# Patient Record
Sex: FEMALE | Race: WHITE | Employment: FULL TIME | URBAN - METROPOLITAN AREA
[De-identification: names, ages, dates, MRNs, and addresses within clinical notes are randomized per-mention and may not be internally consistent; named-entity substitution may affect disease eponyms.]

---

## 2017-12-09 ENCOUNTER — HOSPITAL ENCOUNTER (OUTPATIENT)
Age: 27
Setting detail: SPECIMEN
Discharge: HOME | End: 2017-12-09
Payer: COMMERCIAL

## 2017-12-09 LAB
ABSOLUTE EOS #: 0.4 K/UL (ref 0–0.4)
ABSOLUTE IMMATURE GRANULOCYTE: ABNORMAL K/UL (ref 0–0.3)
ABSOLUTE LYMPH #: 2.5 K/UL (ref 1–4.8)
ABSOLUTE MONO #: 1.2 K/UL (ref 0.2–0.8)
BASOPHILS # BLD: 1 % (ref 0–2)
BASOPHILS ABSOLUTE: 0.1 K/UL (ref 0–0.2)
DIFFERENTIAL TYPE: ABNORMAL
EOSINOPHILS RELATIVE PERCENT: 4 % (ref 1–4)
HCT VFR BLD CALC: 34.3 % (ref 36–46)
HEMOGLOBIN: 11.4 G/DL (ref 12–16)
IMMATURE GRANULOCYTES: ABNORMAL %
LYMPHOCYTES # BLD: 23 % (ref 24–44)
MCH RBC QN AUTO: 31.3 PG (ref 26–34)
MCHC RBC AUTO-ENTMCNC: 33.4 G/DL (ref 31–37)
MCV RBC AUTO: 93.8 FL (ref 80–100)
MONOCYTES # BLD: 11 % (ref 1–7)
PDW BLD-RTO: 16.4 % (ref 11.5–14.5)
PLATELET # BLD: 250 K/UL (ref 130–400)
PLATELET ESTIMATE: ABNORMAL
PMV BLD AUTO: 7.5 FL (ref 6–12)
RBC # BLD: 3.66 M/UL (ref 4–5.2)
RBC # BLD: ABNORMAL 10*6/UL
SEG NEUTROPHILS: 61 % (ref 36–66)
SEGMENTED NEUTROPHILS ABSOLUTE COUNT: 6.8 K/UL (ref 1.8–7.7)
WBC # BLD: 11 K/UL (ref 3.5–11)
WBC # BLD: ABNORMAL 10*3/UL

## 2017-12-09 PROCEDURE — 85025 COMPLETE CBC W/AUTO DIFF WBC: CPT

## 2017-12-14 ENCOUNTER — HOSPITAL ENCOUNTER (OUTPATIENT)
Age: 27
Setting detail: SPECIMEN
Discharge: HOME | End: 2017-12-14
Payer: COMMERCIAL

## 2017-12-14 PROCEDURE — 87086 URINE CULTURE/COLONY COUNT: CPT

## 2017-12-14 PROCEDURE — 81001 URINALYSIS AUTO W/SCOPE: CPT

## 2017-12-15 ENCOUNTER — HOSPITAL ENCOUNTER (OUTPATIENT)
Dept: INTERVENTIONAL RADIOLOGY/VASCULAR | Age: 27
Discharge: HOME | End: 2017-12-15
Payer: COMMERCIAL

## 2017-12-15 ENCOUNTER — HOSPITAL ENCOUNTER (OUTPATIENT)
Age: 27
Setting detail: SPECIMEN
Discharge: HOME | End: 2017-12-15
Payer: COMMERCIAL

## 2017-12-15 VITALS
SYSTOLIC BLOOD PRESSURE: 107 MMHG | TEMPERATURE: 97 F | RESPIRATION RATE: 20 BRPM | OXYGEN SATURATION: 97 % | DIASTOLIC BLOOD PRESSURE: 65 MMHG | HEART RATE: 90 BPM | HEIGHT: 61 IN | BODY MASS INDEX: 22.48 KG/M2 | WEIGHT: 119.05 LBS

## 2017-12-15 DIAGNOSIS — J90 PLEURAL EFFUSION: ICD-10-CM

## 2017-12-15 LAB
-: NORMAL
AMORPHOUS: NORMAL
BACTERIA: NORMAL
BILIRUBIN URINE: NEGATIVE
CASTS UA: NORMAL /LPF
COLOR: YELLOW
COMMENT UA: ABNORMAL
CRYSTALS, UA: NORMAL /HPF
EPITHELIAL CELLS UA: NORMAL /HPF (ref 0–5)
GLUCOSE URINE: NEGATIVE
GLUCOSE, FLUID: 91 MG/DL
HCT VFR BLD CALC: 29.1 % (ref 36–46)
HCT VFR BLD CALC: 34.2 % (ref 36–46)
HEMOGLOBIN: 11.7 G/DL (ref 12–16)
HEMOGLOBIN: 9.9 G/DL (ref 12–16)
INR BLD: 1.1
KETONES, URINE: NEGATIVE
LACTATE DEHYDROGENASE, FLUID: 299 U/L
LEUKOCYTE ESTERASE, URINE: NEGATIVE
MCH RBC QN AUTO: 31.1 PG (ref 26–34)
MCH RBC QN AUTO: 31.1 PG (ref 26–34)
MCHC RBC AUTO-ENTMCNC: 34 G/DL (ref 31–37)
MCHC RBC AUTO-ENTMCNC: 34.2 G/DL (ref 31–37)
MCV RBC AUTO: 90.9 FL (ref 80–100)
MCV RBC AUTO: 91.4 FL (ref 80–100)
MUCUS: NORMAL
NITRITE, URINE: NEGATIVE
OTHER OBSERVATIONS UA: NORMAL
PARTIAL THROMBOPLASTIN TIME: 20.2 SEC (ref 23–31)
PDW BLD-RTO: 15.8 % (ref 11.5–14.5)
PDW BLD-RTO: 15.9 % (ref 11.5–14.5)
PH FLUID: 8.2
PH UA: 6 (ref 5–8)
PLATELET # BLD: 170 K/UL (ref 130–400)
PLATELET # BLD: 198 K/UL (ref 130–400)
PLATELET # BLD: 224 K/UL (ref 130–400)
PMV BLD AUTO: 6.9 FL (ref 6–12)
PMV BLD AUTO: 7 FL (ref 6–12)
PROTEIN UA: NEGATIVE
PROTHROMBIN TIME: 11.2 SEC (ref 9.7–11.6)
RBC # BLD: 3.19 M/UL (ref 4–5.2)
RBC # BLD: 3.76 M/UL (ref 4–5.2)
RBC UA: NORMAL /HPF (ref 0–2)
RENAL EPITHELIAL, UA: NORMAL /HPF
SEDIMENTATION RATE, ERYTHROCYTE: 125 MM (ref 0–20)
SPECIFIC GRAVITY UA: 1 (ref 1–1.03)
SPECIMEN TYPE: NORMAL
TOTAL PROTEIN, BODY FLUID: 6.1 G/DL
TRICHOMONAS: NORMAL
TURBIDITY: CLEAR
URINE HGB: ABNORMAL
UROBILINOGEN, URINE: NORMAL
WBC # BLD: 1 K/UL (ref 3.5–11)
WBC # BLD: 1.2 K/UL (ref 3.5–11)
WBC UA: NORMAL /HPF (ref 0–5)
YEAST: NORMAL

## 2017-12-15 PROCEDURE — 2580000003 HC RX 258: Performed by: RADIOLOGY

## 2017-12-15 PROCEDURE — 87070 CULTURE OTHR SPECIMN AEROBIC: CPT

## 2017-12-15 PROCEDURE — 82945 GLUCOSE OTHER FLUID: CPT

## 2017-12-15 PROCEDURE — 87075 CULTR BACTERIA EXCEPT BLOOD: CPT

## 2017-12-15 PROCEDURE — 85610 PROTHROMBIN TIME: CPT

## 2017-12-15 PROCEDURE — 7100000000 HC PACU RECOVERY - FIRST 15 MIN

## 2017-12-15 PROCEDURE — 85049 AUTOMATED PLATELET COUNT: CPT

## 2017-12-15 PROCEDURE — 88112 CYTOPATH CELL ENHANCE TECH: CPT

## 2017-12-15 PROCEDURE — 85730 THROMBOPLASTIN TIME PARTIAL: CPT

## 2017-12-15 PROCEDURE — 7100000001 HC PACU RECOVERY - ADDTL 15 MIN

## 2017-12-15 PROCEDURE — 87205 SMEAR GRAM STAIN: CPT

## 2017-12-15 PROCEDURE — 85027 COMPLETE CBC AUTOMATED: CPT

## 2017-12-15 PROCEDURE — 32555 ASPIRATE PLEURA W/ IMAGING: CPT | Performed by: RADIOLOGY

## 2017-12-15 PROCEDURE — 84157 ASSAY OF PROTEIN OTHER: CPT

## 2017-12-15 PROCEDURE — C1729 CATH, DRAINAGE: HCPCS

## 2017-12-15 PROCEDURE — 89051 BODY FLUID CELL COUNT: CPT

## 2017-12-15 PROCEDURE — 83615 LACTATE (LD) (LDH) ENZYME: CPT

## 2017-12-15 PROCEDURE — 85651 RBC SED RATE NONAUTOMATED: CPT

## 2017-12-15 PROCEDURE — 83986 ASSAY PH BODY FLUID NOS: CPT

## 2017-12-15 PROCEDURE — 36415 COLL VENOUS BLD VENIPUNCTURE: CPT

## 2017-12-15 PROCEDURE — 88305 TISSUE EXAM BY PATHOLOGIST: CPT

## 2017-12-15 RX ORDER — OXYCODONE HCL 10 MG/1
10 TABLET, FILM COATED, EXTENDED RELEASE ORAL EVERY 12 HOURS
COMMUNITY
End: 2018-02-01 | Stop reason: ALTCHOICE

## 2017-12-15 RX ORDER — SENNA PLUS 8.6 MG/1
1 TABLET ORAL DAILY
COMMUNITY
End: 2018-02-01 | Stop reason: ALTCHOICE

## 2017-12-15 RX ORDER — IBUPROFEN 200 MG
600 TABLET ORAL EVERY 6 HOURS PRN
COMMUNITY

## 2017-12-15 RX ORDER — SODIUM CHLORIDE 9 MG/ML
INJECTION, SOLUTION INTRAVENOUS CONTINUOUS
Status: DISCONTINUED | OUTPATIENT
Start: 2017-12-15 | End: 2017-12-18 | Stop reason: HOSPADM

## 2017-12-15 RX ORDER — DILTIAZEM HYDROCHLORIDE 60 MG/1
120 TABLET, FILM COATED ORAL DAILY
COMMUNITY
End: 2018-02-01 | Stop reason: ALTCHOICE

## 2017-12-15 RX ORDER — ATENOLOL 25 MG/1
25 TABLET ORAL 2 TIMES DAILY
COMMUNITY

## 2017-12-15 RX ORDER — CHLORHEXIDINE GLUCONATE 0.12 MG/ML
15 RINSE ORAL 2 TIMES DAILY
COMMUNITY
End: 2018-02-01 | Stop reason: ALTCHOICE

## 2017-12-15 RX ORDER — SODIUM CHLORIDE 0.9 % (FLUSH) 0.9 %
10 SYRINGE (ML) INJECTION PRN
Status: DISCONTINUED | OUTPATIENT
Start: 2017-12-15 | End: 2017-12-18 | Stop reason: HOSPADM

## 2017-12-15 RX ORDER — FAMOTIDINE 20 MG/1
20 TABLET, FILM COATED ORAL 2 TIMES DAILY
COMMUNITY

## 2017-12-15 RX ORDER — ACETAMINOPHEN 325 MG/1
650 TABLET ORAL EVERY 4 HOURS PRN
Status: DISCONTINUED | OUTPATIENT
Start: 2017-12-15 | End: 2017-12-18 | Stop reason: HOSPADM

## 2017-12-15 RX ORDER — ONDANSETRON 4 MG/1
4 TABLET, FILM COATED ORAL EVERY 8 HOURS PRN
COMMUNITY
End: 2018-02-01 | Stop reason: ALTCHOICE

## 2017-12-15 RX ORDER — ACETAMINOPHEN 325 MG/1
650 TABLET ORAL EVERY 6 HOURS PRN
COMMUNITY
End: 2018-02-01 | Stop reason: ALTCHOICE

## 2017-12-15 RX ORDER — BISACODYL 10 MG
10 SUPPOSITORY, RECTAL RECTAL DAILY PRN
COMMUNITY
End: 2018-02-01 | Stop reason: ALTCHOICE

## 2017-12-15 RX ORDER — ATORVASTATIN CALCIUM 20 MG/1
20 TABLET, FILM COATED ORAL DAILY
COMMUNITY
End: 2018-02-01 | Stop reason: ALTCHOICE

## 2017-12-15 RX ORDER — ALBUTEROL SULFATE 2.5 MG/3ML
2.5 SOLUTION RESPIRATORY (INHALATION) EVERY 6 HOURS PRN
COMMUNITY
End: 2018-02-01 | Stop reason: ALTCHOICE

## 2017-12-15 RX ORDER — M-VIT,TX,IRON,MINS/CALC/FOLIC 27MG-0.4MG
1 TABLET ORAL DAILY
COMMUNITY

## 2017-12-15 RX ORDER — LUBIPROSTONE 24 UG/1
24 CAPSULE, GELATIN COATED ORAL 2 TIMES DAILY WITH MEALS
COMMUNITY
End: 2018-02-01 | Stop reason: ALTCHOICE

## 2017-12-15 RX ADMIN — SODIUM CHLORIDE: 9 INJECTION, SOLUTION INTRAVENOUS at 09:43

## 2017-12-15 ASSESSMENT — PAIN SCALES - GENERAL
PAINLEVEL_OUTOF10: 0

## 2017-12-15 ASSESSMENT — PAIN - FUNCTIONAL ASSESSMENT: PAIN_FUNCTIONAL_ASSESSMENT: 0-10

## 2017-12-15 NOTE — H&P
History and Physical Update    Pt Name: Raghavendra Aguilar  MRN: [id number]  YOB: 1990  Date of evaluation: 12/15/2017      [x] I have reviewed the H&P sent by hard copy and placed on the short chart found in [facility] dated 12/8/17 by Dr. Anne Lundberg which meets the criteria for an Interval History and Physical note and is attached below. Procedure: Thoracentesis with pleural imaging  Dx: MSSA Sepsis , infected PMM wire  [x] [facility] and there are no changes to the patient or plans. Vital signs: /63   Pulse 99   Temp 97.3 °F (36.3 °C) (Oral)   Resp 23   Ht 5' 1\" (1.549 m)   Wt 119 lb 0.8 oz (54 kg)   LMP 11/15/2017   SpO2 96%   BMI 22.49 kg/m²     Allergies:  Penicillins    Medications:   Current Outpatient Prescriptions on File Prior to Encounter   Medication Sig Dispense Refill    nadolol (CORGARD) 20 MG tablet Take 1 tablet by mouth daily Pt must make and keep appointment for further refills. 30 tablet 2    nadolol (CORGARD) 40 MG tablet Take 1 tablet by mouth daily. 32 tablet 12     No current facility-administered medications on file prior to encounter. Prior to Admission medications    Medication Sig Start Date End Date Taking? Authorizing Provider   sodium chloride 0.9 % SOLN 50 mL with ceFEPIme 2 g SOLR 2 g Infuse 2 g intravenously every 12 hours   Yes Historical Provider, MD   chlorhexidine (PERIDEX) 0.12 % solution Take 15 mLs by mouth 2 times daily   Yes Historical Provider, MD   famotidine (PEPCID) 20 MG tablet Take 20 mg by mouth 2 times daily   Yes Historical Provider, MD   nicotine (NICODERM CQ) 7 MG/24HR Place 1 patch onto the skin every 24 hours   Yes Historical Provider, MD   oxyCODONE (OXYCONTIN) 10 MG extended release tablet Take 10 mg by mouth every 12 hours .    Yes Historical Provider, MD   Multiple Vitamins-Minerals (THERAPEUTIC MULTIVITAMIN-MINERALS) tablet Take 1 tablet by mouth daily   Yes Historical Provider, MD enoxaparin (LOVENOX) 120 MG/0.8ML injection Inject 1 mg/kg into the skin every 12 hours   Yes Historical Provider, MD   ibuprofen (ADVIL;MOTRIN) 200 MG tablet Take 600 mg by mouth every 6 hours as needed for Pain   Yes Historical Provider, MD   atenolol (TENORMIN) 25 MG tablet Take 25 mg by mouth 2 times daily    Historical Provider, MD   acetaminophen (TYLENOL) 325 MG tablet Take 650 mg by mouth every 6 hours as needed for Pain    Historical Provider, MD   albuterol (PROVENTIL) (2.5 MG/3ML) 0.083% nebulizer solution Take 2.5 mg by nebulization every 6 hours as needed for Wheezing    Historical Provider, MD   bisacodyl (DULCOLAX) 10 MG suppository Place 10 mg rectally daily as needed for Constipation    Historical Provider, MD   ondansetron (ZOFRAN) 4 MG tablet Take 4 mg by mouth every 8 hours as needed for Nausea or Vomiting    Historical Provider, MD   senna (SENOKOT) 8.6 MG tablet Take 1 tablet by mouth daily    Historical Provider, MD   Lactobacillus (ACIDOPHOLUS PO) Take 1 capsule by mouth 2 times daily    Historical Provider, MD   lubiprostone (AMITIZA) 24 MCG capsule Take 24 mcg by mouth 2 times daily (with meals)    Historical Provider, MD   atorvastatin (LIPITOR) 20 MG tablet Take 20 mg by mouth daily    Historical Provider, MD   diltiazem (CARDIZEM) 60 MG tablet Take 120 mg by mouth daily    Historical Provider, MD   nadolol (CORGARD) 20 MG tablet Take 1 tablet by mouth daily Pt must make and keep appointment for further refills. 8/11/15   Feli Celeste MD   nadolol (CORGARD) 40 MG tablet Take 1 tablet by mouth daily. 2/13/14   Feli Celeste MD       This is a 32 y.o. female who is  pleasant, cooperative, alert and oriented x3, in no acute distress. Heart: NSR    Lungs:Decreased breath sounds    Abdomen: soft, nontender, nondistended, no masses or organomegaly.        Sanna Beck NP  Electronically signed 12/15/2017 at 9:41 AM

## 2017-12-16 LAB
CULTURE: NO GROWTH
CULTURE: NORMAL
Lab: NORMAL
Lab: NORMAL
SPECIMEN DESCRIPTION: NORMAL
SPECIMEN DESCRIPTION: NORMAL
STATUS: NORMAL

## 2017-12-17 ENCOUNTER — HOSPITAL ENCOUNTER (OUTPATIENT)
Age: 27
Setting detail: SPECIMEN
Discharge: HOME | End: 2017-12-17
Payer: COMMERCIAL

## 2017-12-17 LAB
ABSOLUTE EOS #: 0.13 K/UL (ref 0–0.4)
ABSOLUTE IMMATURE GRANULOCYTE: ABNORMAL K/UL (ref 0–0.3)
ABSOLUTE LYMPH #: 1.6 K/UL (ref 1–4.8)
ABSOLUTE MONO #: 0.27 K/UL (ref 0.2–0.8)
ANION GAP SERPL CALCULATED.3IONS-SCNC: 15 MMOL/L (ref 9–17)
BASOPHILS # BLD: 1 %
BASOPHILS ABSOLUTE: 0.02 K/UL (ref 0–0.2)
BUN BLDV-MCNC: 3 MG/DL (ref 6–20)
BUN/CREAT BLD: ABNORMAL (ref 9–20)
CALCIUM SERPL-MCNC: 9.2 MG/DL (ref 8.6–10.4)
CHLORIDE BLD-SCNC: 97 MMOL/L (ref 98–107)
CO2: 27 MMOL/L (ref 20–31)
CREAT SERPL-MCNC: <0.4 MG/DL (ref 0.5–0.9)
DIFFERENTIAL TYPE: ABNORMAL
EOSINOPHILS RELATIVE PERCENT: 6 % (ref 1–4)
GFR AFRICAN AMERICAN: ABNORMAL ML/MIN
GFR NON-AFRICAN AMERICAN: ABNORMAL ML/MIN
GFR SERPL CREATININE-BSD FRML MDRD: ABNORMAL ML/MIN/{1.73_M2}
GFR SERPL CREATININE-BSD FRML MDRD: ABNORMAL ML/MIN/{1.73_M2}
GLUCOSE BLD-MCNC: 157 MG/DL (ref 70–99)
HCT VFR BLD CALC: 25.9 % (ref 36–46)
HEMOGLOBIN: 8.8 G/DL (ref 12–16)
IMMATURE GRANULOCYTES: ABNORMAL %
LYMPHOCYTES # BLD: 76 % (ref 24–44)
MCH RBC QN AUTO: 30.9 PG (ref 26–34)
MCHC RBC AUTO-ENTMCNC: 33.9 G/DL (ref 31–37)
MCV RBC AUTO: 91.1 FL (ref 80–100)
MONOCYTES # BLD: 13 % (ref 1–7)
PDW BLD-RTO: 15.8 % (ref 11.5–14.5)
PLATELET # BLD: 198 K/UL (ref 130–400)
PLATELET ESTIMATE: ABNORMAL
PMV BLD AUTO: 6.9 FL (ref 6–12)
POTASSIUM SERPL-SCNC: 3.1 MMOL/L (ref 3.7–5.3)
RBC # BLD: 2.84 M/UL (ref 4–5.2)
RBC # BLD: ABNORMAL 10*6/UL
SEG NEUTROPHILS: 4 % (ref 36–66)
SEGMENTED NEUTROPHILS ABSOLUTE COUNT: 0.08 K/UL (ref 1.8–7.7)
SODIUM BLD-SCNC: 139 MMOL/L (ref 135–144)
WBC # BLD: 2.1 K/UL (ref 3.5–11)
WBC # BLD: ABNORMAL 10*3/UL

## 2017-12-17 PROCEDURE — 80048 BASIC METABOLIC PNL TOTAL CA: CPT

## 2017-12-17 PROCEDURE — 85025 COMPLETE CBC W/AUTO DIFF WBC: CPT

## 2017-12-18 LAB
APPEARANCE FLUID: NORMAL
BASO FLUID: NORMAL %
CASE NUMBER:: NORMAL
COLOR FLUID: NORMAL
EOSINOPHIL FLUID: NORMAL %
FLUID DIFF COMMENT: NORMAL
LYMPHOCYTES, BODY FLUID: 85 %
MONOCYTE, FLUID: NORMAL %
NEUTROPHIL, FLUID: 9 %
OTHER CELLS FLUID: NORMAL %
RBC FLUID: NORMAL /MM3
SPECIMEN DESCRIPTION: NORMAL
SPECIMEN TYPE: NORMAL
WBC FLUID: 320 /MM3

## 2017-12-19 LAB — SURGICAL PATHOLOGY REPORT: NORMAL

## 2017-12-21 ENCOUNTER — HOSPITAL ENCOUNTER (OUTPATIENT)
Age: 27
Setting detail: SPECIMEN
Discharge: HOME | End: 2017-12-21
Payer: COMMERCIAL

## 2017-12-21 LAB
C-REACTIVE PROTEIN: 14.8 MG/L (ref 0–5)
CULTURE: ABNORMAL
CULTURE: ABNORMAL
DIRECT EXAM: ABNORMAL
Lab: ABNORMAL
SEDIMENTATION RATE, ERYTHROCYTE: 97 MM (ref 0–20)
SPECIMEN DESCRIPTION: ABNORMAL
SPECIMEN DESCRIPTION: ABNORMAL
STATUS: ABNORMAL

## 2017-12-21 PROCEDURE — 86140 C-REACTIVE PROTEIN: CPT

## 2017-12-21 PROCEDURE — 85651 RBC SED RATE NONAUTOMATED: CPT

## 2017-12-22 ENCOUNTER — HOSPITAL ENCOUNTER (OUTPATIENT)
Age: 27
Setting detail: SPECIMEN
Discharge: HOME | End: 2017-12-22
Payer: COMMERCIAL

## 2017-12-22 LAB
VANCOMYCIN TROUGH DATE LAST DOSE: ABNORMAL
VANCOMYCIN TROUGH DOSE AMOUNT: ABNORMAL
VANCOMYCIN TROUGH TIME LAST DOSE: ABNORMAL
VANCOMYCIN TROUGH: 8 UG/ML (ref 10–20)

## 2017-12-22 PROCEDURE — 80202 ASSAY OF VANCOMYCIN: CPT

## 2017-12-26 ENCOUNTER — HOSPITAL ENCOUNTER (OUTPATIENT)
Age: 27
Setting detail: SPECIMEN
Discharge: HOME | End: 2017-12-26
Payer: COMMERCIAL

## 2017-12-26 LAB
C-REACTIVE PROTEIN: 5.4 MG/L (ref 0–5)
SEDIMENTATION RATE, ERYTHROCYTE: 60 MM (ref 0–20)

## 2017-12-26 PROCEDURE — 86140 C-REACTIVE PROTEIN: CPT

## 2017-12-26 PROCEDURE — 85651 RBC SED RATE NONAUTOMATED: CPT

## 2017-12-28 ENCOUNTER — HOSPITAL ENCOUNTER (OUTPATIENT)
Age: 27
Setting detail: SPECIMEN
Discharge: HOME | End: 2017-12-28
Payer: COMMERCIAL

## 2017-12-28 LAB
VANCOMYCIN TROUGH DATE LAST DOSE: NORMAL
VANCOMYCIN TROUGH DOSE AMOUNT: NORMAL
VANCOMYCIN TROUGH TIME LAST DOSE: NORMAL
VANCOMYCIN TROUGH: 17 UG/ML (ref 10–20)

## 2017-12-28 PROCEDURE — 80202 ASSAY OF VANCOMYCIN: CPT

## 2017-12-28 PROCEDURE — 36415 COLL VENOUS BLD VENIPUNCTURE: CPT

## 2017-12-31 ENCOUNTER — HOSPITAL ENCOUNTER (OUTPATIENT)
Age: 27
Setting detail: SPECIMEN
Discharge: HOME | End: 2017-12-31
Payer: COMMERCIAL

## 2017-12-31 LAB
ALBUMIN SERPL-MCNC: 4 G/DL (ref 3.5–5.2)
ALBUMIN/GLOBULIN RATIO: ABNORMAL (ref 1–2.5)
ALP BLD-CCNC: 88 U/L (ref 35–104)
ALT SERPL-CCNC: 12 U/L (ref 5–33)
ANION GAP SERPL CALCULATED.3IONS-SCNC: 17 MMOL/L (ref 9–17)
AST SERPL-CCNC: 20 U/L
BILIRUB SERPL-MCNC: 0.27 MG/DL (ref 0.3–1.2)
BUN BLDV-MCNC: 6 MG/DL (ref 6–20)
BUN/CREAT BLD: 13 (ref 9–20)
C-REACTIVE PROTEIN: 3.6 MG/L (ref 0–5)
CALCIUM SERPL-MCNC: 9.7 MG/DL (ref 8.6–10.4)
CHLORIDE BLD-SCNC: 96 MMOL/L (ref 98–107)
CO2: 24 MMOL/L (ref 20–31)
CREAT SERPL-MCNC: 0.46 MG/DL (ref 0.5–0.9)
GFR AFRICAN AMERICAN: >60 ML/MIN
GFR NON-AFRICAN AMERICAN: >60 ML/MIN
GFR SERPL CREATININE-BSD FRML MDRD: ABNORMAL ML/MIN/{1.73_M2}
GFR SERPL CREATININE-BSD FRML MDRD: ABNORMAL ML/MIN/{1.73_M2}
GLUCOSE BLD-MCNC: 89 MG/DL (ref 70–99)
POTASSIUM SERPL-SCNC: 4.1 MMOL/L (ref 3.7–5.3)
SODIUM BLD-SCNC: 137 MMOL/L (ref 135–144)
TOTAL PROTEIN: 7.8 G/DL (ref 6.4–8.3)

## 2017-12-31 PROCEDURE — 86140 C-REACTIVE PROTEIN: CPT

## 2017-12-31 PROCEDURE — 80053 COMPREHEN METABOLIC PANEL: CPT

## 2018-01-01 ENCOUNTER — HOSPITAL ENCOUNTER (OUTPATIENT)
Age: 28
Setting detail: SPECIMEN
Discharge: HOME | End: 2018-01-01
Payer: COMMERCIAL

## 2018-01-01 LAB — SEDIMENTATION RATE, ERYTHROCYTE: 58 MM (ref 0–20)

## 2018-01-01 PROCEDURE — 85651 RBC SED RATE NONAUTOMATED: CPT

## 2018-01-10 ENCOUNTER — HOSPITAL ENCOUNTER (OUTPATIENT)
Age: 28
Setting detail: SPECIMEN
Discharge: HOME | End: 2018-01-10
Payer: COMMERCIAL

## 2018-01-10 LAB — SEDIMENTATION RATE, ERYTHROCYTE: 35 MM (ref 0–20)

## 2018-01-10 PROCEDURE — 85651 RBC SED RATE NONAUTOMATED: CPT

## 2018-01-12 ENCOUNTER — HOSPITAL ENCOUNTER (OUTPATIENT)
Age: 28
Setting detail: SPECIMEN
Discharge: HOME | End: 2018-01-12
Payer: COMMERCIAL

## 2018-01-12 LAB
VANCOMYCIN TROUGH DATE LAST DOSE: NORMAL
VANCOMYCIN TROUGH DOSE AMOUNT: NORMAL
VANCOMYCIN TROUGH TIME LAST DOSE: NORMAL
VANCOMYCIN TROUGH: 18.8 UG/ML (ref 10–20)

## 2018-01-12 PROCEDURE — 80202 ASSAY OF VANCOMYCIN: CPT

## 2018-02-01 ENCOUNTER — OFFICE VISIT (OUTPATIENT)
Dept: INFECTIOUS DISEASES | Age: 28
End: 2018-02-01
Payer: COMMERCIAL

## 2018-02-01 VITALS
SYSTOLIC BLOOD PRESSURE: 103 MMHG | OXYGEN SATURATION: 100 % | HEART RATE: 62 BPM | RESPIRATION RATE: 16 BRPM | BODY MASS INDEX: 23.03 KG/M2 | DIASTOLIC BLOOD PRESSURE: 65 MMHG | WEIGHT: 122 LBS | TEMPERATURE: 97.6 F | HEIGHT: 61 IN

## 2018-02-01 DIAGNOSIS — I26.90 ACUTE SEPTIC PULMONARY EMBOLISM WITHOUT ACUTE COR PULMONALE (HCC): ICD-10-CM

## 2018-02-01 DIAGNOSIS — F19.21 HISTORY OF DRUG DEPENDENCE/ABUSE (HCC): ICD-10-CM

## 2018-02-01 DIAGNOSIS — I33.0 ACUTE BACTERIAL ENDOCARDITIS: Primary | ICD-10-CM

## 2018-02-01 DIAGNOSIS — J85.2 ABSCESS OF LUNG WITHOUT PNEUMONIA, UNSPECIFIED LATERALITY (HCC): ICD-10-CM

## 2018-02-01 PROCEDURE — 99215 OFFICE O/P EST HI 40 MIN: CPT | Performed by: INTERNAL MEDICINE

## 2018-02-01 NOTE — PROGRESS NOTES
bacterial endocarditis post antibiotic treatment. She is accompanied by her father. She is doing well today and claims she has been clean since November. She is currently off oxycodone and is on suboxone currently. She denies SOB, fever, chills, night sweats, chest pain, palpitations, or diarrhea. She walks 4 miles/day and regularly attends group meetings for heroin addiction. She uses a nicotine patch for smoking and denies heavy alcohol use. She currently has a plan to see Dr. Thomas Staff of Cardiology in [facility] on 02/19/18. Dr Tobi Carreon is her PCP, Dr. Dieter Acuna administers Suboxone at the [facility] clinic and Dr. Danny Mayer is her Pulmonologist.       Pertinent review of systems include:No fevers, chills, night sweats, hemoptysis. For complete review of symptoms see ROS section below. DISCUSSION:  Patient is following up for prior bacterial endocarditis infection with pulmonary septic emboli. Patient is currently doing well and is experiencing no symptoms suggestive of residual bacterial infection. Review of prior blood cultures shows no growt on multiple cultures during and post treatment. Will obtain one more set of blood cultures to ensure clearance of bacteria continues without relapse. She will follow up with cardiology to determine if any additional treatment for the tricuspid vegetation is needed. From the ID perspective if the blood cultures show no growth no valvular replacement would be necessary, unless valvular dysfunction leads to congestive heart failure. Discussed with patient, family. I have personally reviewed the past medical history, past surgical history, medications, social history, and family history, and I have updated the database accordingly.   Past Medical History:     Past Medical History:   Diagnosis Date    Endocarditis     Prolonged QT syndrome 2006    Prolonged QT syndrome     Pulmonary emboli (HCC)     septic    Staph aureus infection     staph aureus methicillin 12/17/2017    LYMPHOPCT 23 12/09/2017    MONOPCT 13 12/17/2017    MONOPCT 11 12/09/2017     BMP:   Lab Results   Component Value Date     12/31/2017     12/17/2017    K 4.1 12/31/2017    K 3.1 12/17/2017    CL 96 12/31/2017    CL 97 12/17/2017    CO2 24 12/31/2017    CO2 27 12/17/2017    BUN 6 12/31/2017    BUN 3 12/17/2017    CREATININE 0.46 12/31/2017    CREATININE <0.40 12/17/2017    MG 2.1 12/10/2012    MG 2.0 01/30/2012     Hepatic Function Panel:   Lab Results   Component Value Date    PROT 7.8 12/31/2017    LABALBU 4.0 12/31/2017    BILITOT 0.27 12/31/2017    ALKPHOS 88 12/31/2017    ALT 12 12/31/2017    AST 20 12/31/2017       Lab Results   Component Value Date    MUCUS NOT REPORTED 12/14/2017    RBC 2.84 12/17/2017    TRICHOMONAS NOT REPORTED 12/14/2017    WBC 2.1 12/17/2017    YEAST NOT REPORTED 12/14/2017    TURBIDITY CLEAR 12/14/2017     Lab Results   Component Value Date    CREATININE 0.46 12/31/2017    GLUCOSE 89 12/31/2017    GLUCOSE 109 01/30/2012       Thank you for allowing us to participate in the care of this patient. Please call with questions.     Bryon Kim MD  Pager: [phone] - Office: [phone]

## 2018-02-01 NOTE — LETTER
tricuspid valve endocarditis with vegetations. Additionally the patient suffered from QT prolongation syndrome for which she previously had an AICD and a PICC line placed. Vegetations were found on both. During the course of the hospital stay the AICD and PICC line were removed. She also received treatment for ARDS secondary to the pulmonary emboli. She was allergic to penicillins and cefazolin but tolerated cefepime. The subsequent blood cultures showed clearance of the bacteria. Patient was transferred to [facility] where she developed leucopenia while on cefepime. Treatment was changed to vancomycin which she tolerated. The pulmonary septic emboli and a Rt pleural effusion improved with treatment. The pulmonary abscesses created by the septic pulmonary emboli also cleared with treatment. She presents today for follow-up of bacterial endocarditis post antibiotic treatment. She is accompanied by her father. She is doing well today and claims she has been clean since November. She is currently off oxycodone and is on suboxone currently. She denies SOB, fever, chills, night sweats, chest pain, palpitations, or diarrhea. She walks 4 miles/day and regularly attends group meetings for heroin addiction. She uses a nicotine patch for smoking and denies heavy alcohol use. She currently has a plan to see Dr. Nora Kang of Cardiology in [facility] on 02/19/18. Dr Carlos Enrique Draper is her PCP, Dr. Sean Silveira administers Suboxone at the [facility] clinic and Dr. Tiana Jack is her Pulmonologist.       Pertinent review of systems include:No fevers, chills, night sweats, hemoptysis. For complete review of symptoms see ROS section below. DISCUSSION:  Patient is following up for prior bacterial endocarditis infection with pulmonary septic emboli. Patient is currently doing well and is experiencing no symptoms suggestive of residual bacterial infection.  Review of prior blood cultures shows no growt on multiple cultures during Occupational History    Not on file. Social History Main Topics    Smoking status: Former Smoker     Types: Cigars     Quit date: 11/17/2017    Smokeless tobacco: Never Used    Alcohol use Not on file    Drug use: Yes     Types: IV      Comment: no IV drug use since 9/17/17    Sexual activity: Not Currently     Other Topics Concern    Not on file     Social History Narrative    No narrative on file       Family History:   No family history on file. Allergies:   Cephalosporins and Penicillins     Review of Systems:   Constitutional: No fevers or chills. No systemic complaints  Head: No headaches  Eyes: No double vision or blurry vision. ENT: No sore throat or runny nose. . No hearing loss, tinnitus or vertigo. Cardiovascular: No chest pain or palpitations. No shortness of breath. No SRINIVASAN  Lung: No shortness of breath or cough. No sputum production  Abdomen: No nausea, vomiting, diarrhea, or abdominal pain. .  Genitourinary: No increased urinary frequency, or dysuria. No hematuria. No suprapubic or CVA pain  Musculoskeletal: No muscle aches or pains. No joint effusions or deformities. Claims left arm is more swollen than right. Hematologic: No bleeding or bruising. Neurologic: No headache, weakness, numbness, or tingling. Physical Examination :   /65 (Site: Left Arm, Position: Sitting)   Pulse 62   Temp 97.6 °F (36.4 °C)   Resp 16   Ht 5' 1\" (1.549 m)   Wt 122 lb (55.3 kg)   SpO2 100% Comment: Room air  BMI 23.05 kg/m²     General Appearance: Awake, alert, and in no apparent distress  Head:  Normocephalic, no trauma  Eyes: Pupils equal, round, reactive, to light and accommodation; extraocular movements intact; sclera anicteric; conjunctivae pink. No embolic phenomena. ENT: Oropharynx clear, without erythema, exudate, or thrush. No tenderness of sinuses. Mouth/throat: mucosa pink and moist. No lesions. Dentition in good repair.

## 2018-05-20 ENCOUNTER — HOSPITAL ENCOUNTER (EMERGENCY)
Facility: CLINIC | Age: 28
Discharge: HOME | End: 2018-05-20
Attending: EMERGENCY MEDICINE
Payer: COMMERCIAL

## 2018-05-20 VITALS
HEART RATE: 85 BPM | OXYGEN SATURATION: 99 % | WEIGHT: 115 LBS | TEMPERATURE: 97.9 F | RESPIRATION RATE: 16 BRPM | HEIGHT: 61 IN | DIASTOLIC BLOOD PRESSURE: 75 MMHG | BODY MASS INDEX: 21.71 KG/M2 | SYSTOLIC BLOOD PRESSURE: 112 MMHG

## 2018-05-20 DIAGNOSIS — S05.02XA ABRASION OF LEFT CORNEA, INITIAL ENCOUNTER: Primary | ICD-10-CM

## 2018-05-20 PROCEDURE — 99283 EMERGENCY DEPT VISIT LOW MDM: CPT

## 2018-05-20 PROCEDURE — 6370000000 HC RX 637 (ALT 250 FOR IP): Performed by: EMERGENCY MEDICINE

## 2018-05-20 RX ORDER — TOBRAMYCIN 3 MG/ML
2 SOLUTION/ DROPS OPHTHALMIC ONCE
Status: COMPLETED | OUTPATIENT
Start: 2018-05-20 | End: 2018-05-20

## 2018-05-20 RX ADMIN — TOBRAMYCIN 2 DROP: 3 SOLUTION/ DROPS OPHTHALMIC at 05:53

## 2018-05-20 ASSESSMENT — ENCOUNTER SYMPTOMS
VOMITING: 0
EYE DISCHARGE: 1
EYE PAIN: 0
SORE THROAT: 0
RHINORRHEA: 0
PHOTOPHOBIA: 0
SHORTNESS OF BREATH: 0
BACK PAIN: 0
NAUSEA: 0
EYE REDNESS: 1
DIARRHEA: 0
COUGH: 0
ABDOMINAL PAIN: 0

## 2018-08-19 ENCOUNTER — APPOINTMENT (OUTPATIENT)
Dept: GENERAL RADIOLOGY | Facility: CLINIC | Age: 28
End: 2018-08-19
Payer: COMMERCIAL

## 2018-08-19 ENCOUNTER — HOSPITAL ENCOUNTER (EMERGENCY)
Facility: CLINIC | Age: 28
Discharge: HOME | End: 2018-08-19
Attending: EMERGENCY MEDICINE
Payer: COMMERCIAL

## 2018-08-19 VITALS
DIASTOLIC BLOOD PRESSURE: 54 MMHG | HEART RATE: 62 BPM | RESPIRATION RATE: 14 BRPM | SYSTOLIC BLOOD PRESSURE: 118 MMHG | WEIGHT: 120 LBS | TEMPERATURE: 97.9 F | BODY MASS INDEX: 20.49 KG/M2 | HEIGHT: 64 IN | OXYGEN SATURATION: 98 %

## 2018-08-19 DIAGNOSIS — R53.83 FATIGUE, UNSPECIFIED TYPE: Primary | ICD-10-CM

## 2018-08-19 LAB
ABSOLUTE EOS #: 0.4 K/UL (ref 0–0.4)
ABSOLUTE IMMATURE GRANULOCYTE: NORMAL K/UL (ref 0–0.3)
ABSOLUTE LYMPH #: 2.5 K/UL (ref 1–4.8)
ABSOLUTE MONO #: 0.6 K/UL (ref 0.1–1.2)
AMPHETAMINE SCREEN URINE: NEGATIVE
ANION GAP SERPL CALCULATED.3IONS-SCNC: 13 MMOL/L (ref 9–17)
BARBITURATE SCREEN URINE: NEGATIVE
BASOPHILS # BLD: 1 % (ref 0–2)
BASOPHILS ABSOLUTE: 0.1 K/UL (ref 0–0.2)
BENZODIAZEPINE SCREEN, URINE: NEGATIVE
BILIRUBIN URINE: NEGATIVE
BUN BLDV-MCNC: 7 MG/DL (ref 6–20)
BUN/CREAT BLD: ABNORMAL (ref 9–20)
BUPRENORPHINE URINE: NORMAL
CALCIUM SERPL-MCNC: 10.3 MG/DL (ref 8.6–10.4)
CANNABINOID SCREEN URINE: NEGATIVE
CHLORIDE BLD-SCNC: 96 MMOL/L (ref 98–107)
CO2: 29 MMOL/L (ref 20–31)
COCAINE METABOLITE, URINE: NEGATIVE
COLOR: YELLOW
COMMENT UA: NORMAL
CREAT SERPL-MCNC: 0.7 MG/DL (ref 0.5–0.9)
DIFFERENTIAL TYPE: NORMAL
EOSINOPHILS RELATIVE PERCENT: 4 % (ref 1–4)
GFR AFRICAN AMERICAN: >60 ML/MIN
GFR NON-AFRICAN AMERICAN: >60 ML/MIN
GFR SERPL CREATININE-BSD FRML MDRD: ABNORMAL ML/MIN/{1.73_M2}
GFR SERPL CREATININE-BSD FRML MDRD: ABNORMAL ML/MIN/{1.73_M2}
GLUCOSE BLD-MCNC: 124 MG/DL (ref 70–99)
GLUCOSE URINE: NEGATIVE
HCG(URINE) PREGNANCY TEST: NEGATIVE
HCT VFR BLD CALC: 45.7 % (ref 36–46)
HEMOGLOBIN: 15.7 G/DL (ref 12–16)
IMMATURE GRANULOCYTES: NORMAL %
KETONES, URINE: NEGATIVE
LEUKOCYTE ESTERASE, URINE: NEGATIVE
LYMPHOCYTES # BLD: 28 % (ref 24–44)
MCH RBC QN AUTO: 33.3 PG (ref 26–34)
MCHC RBC AUTO-ENTMCNC: 34.4 G/DL (ref 31–37)
MCV RBC AUTO: 96.8 FL (ref 80–100)
MDMA URINE: NORMAL
METHADONE SCREEN, URINE: NEGATIVE
METHAMPHETAMINE, URINE: NORMAL
MONOCYTES # BLD: 6 % (ref 2–11)
NITRITE, URINE: NEGATIVE
NRBC AUTOMATED: NORMAL PER 100 WBC
OPIATES, URINE: NEGATIVE
OXYCODONE SCREEN URINE: NEGATIVE
PDW BLD-RTO: 13.1 % (ref 12.5–15.4)
PH UA: 8 (ref 5–8)
PHENCYCLIDINE, URINE: NEGATIVE
PLATELET # BLD: 292 K/UL (ref 140–450)
PLATELET ESTIMATE: NORMAL
PMV BLD AUTO: 7.7 FL (ref 6–12)
POTASSIUM SERPL-SCNC: 4.1 MMOL/L (ref 3.7–5.3)
PROPOXYPHENE, URINE: NORMAL
PROTEIN UA: NEGATIVE
RBC # BLD: 4.72 M/UL (ref 4–5.2)
RBC # BLD: NORMAL 10*6/UL
SEG NEUTROPHILS: 61 % (ref 36–66)
SEGMENTED NEUTROPHILS ABSOLUTE COUNT: 5.5 K/UL (ref 1.8–7.7)
SODIUM BLD-SCNC: 138 MMOL/L (ref 135–144)
SPECIFIC GRAVITY UA: 1.01 (ref 1–1.03)
TEST INFORMATION: NORMAL
TRICYCLIC ANTIDEPRESSANTS, UR: NORMAL
TURBIDITY: CLEAR
URINE HGB: NEGATIVE
UROBILINOGEN, URINE: NORMAL
WBC # BLD: 9 K/UL (ref 3.5–11)
WBC # BLD: NORMAL 10*3/UL

## 2018-08-19 PROCEDURE — 80048 BASIC METABOLIC PNL TOTAL CA: CPT

## 2018-08-19 PROCEDURE — 84443 ASSAY THYROID STIM HORMONE: CPT

## 2018-08-19 PROCEDURE — 84703 CHORIONIC GONADOTROPIN ASSAY: CPT

## 2018-08-19 PROCEDURE — 80307 DRUG TEST PRSMV CHEM ANLYZR: CPT

## 2018-08-19 PROCEDURE — 85025 COMPLETE CBC W/AUTO DIFF WBC: CPT

## 2018-08-19 PROCEDURE — 99284 EMERGENCY DEPT VISIT MOD MDM: CPT

## 2018-08-19 PROCEDURE — 71046 X-RAY EXAM CHEST 2 VIEWS: CPT

## 2018-08-19 PROCEDURE — 36415 COLL VENOUS BLD VENIPUNCTURE: CPT

## 2018-08-19 ASSESSMENT — PAIN SCALES - GENERAL: PAINLEVEL_OUTOF10: 0

## 2018-08-19 NOTE — ED PROVIDER NOTES
[facility] ED  [address] [address]  Phone: [phone]  eMERGENCY dEPARTMENT eNCOUnter      Pt Name: Jeremías Posadas  MRN: [id number]  [place] 1990  Date of evaluation: 2018    CHIEF COMPLAINT       Chief Complaint   Patient presents with    Fatigue     3 weeks     Headache        HISTORY OF PRESENT ILLNESS    Jeremías Posadas is a 25 y.o. female who presents To the emergency department with persistent fatigue and body aches with headaches for the past several weeks. Seen by her doctor for these symptoms and had outpatient labs done which were negative. She has a history of heroin abuse and she has been clean for 9 months. She suffered endocarditis and pneumonia. She does smoke. She denies fevers or chills. She says she just feels like \"dead\". Headache is frontal and intermittent currently 0 out of 10. REVIEW OF SYSTEMS       Constitutional: No fevers or chills . Positive fatigue  HEENT: No sore throat, rhinorrhea, or earache   Eyes: No blurry vision or double vision no drainage   Cardiovascular: No chest pain or tachycardia   Respiratory: No wheezing or shortness of breath positive cough positive difficulty taking a deep breath at times   Gastrointestinal: Positive nausea no vomiting, diarrhea, constipation, or abdominal pain   : No hematuria or dysuria   Musculoskeletal: No swelling or pain   Skin: No rash   Neurological: No focal neurologic complaints, paresthesias, weakness, positive intermittent headache     PAST MEDICAL HISTORY    has a past medical history of Endocarditis; Prolonged QT syndrome; Prolonged QT syndrome; Pulmonary emboli ([address] and Staph aureus infection. SURGICAL HISTORY      has a past surgical history that includes Cardiac defibrillator placement (); Cardiac defibrillator placement (2017);  section (2009); thoracentesis (Right, 12/15/2017); and Cardiac defibrillator placement.     CURRENT MEDICATIONS       Previous Glucose, Ur NEGATIVE NEG    Bilirubin Urine NEGATIVE NEG    Ketones, Urine NEGATIVE NEG    Specific Gravity, UA 1.015 1.005 - 1.030    Urine Hgb NEGATIVE NEG    pH, UA 8.0 5.0 - 8.0    Protein, UA NEGATIVE NEG    Urobilinogen, Urine Normal NORM    Nitrite, Urine NEGATIVE NEG    Leukocyte Esterase, Urine NEGATIVE NEG    Urinalysis Comments       Microscopic exam not performed based on chemical results unless requested in   Pregnancy, Urine   Result Value Ref Range    HCG(Urine) Pregnancy Test NEGATIVE NEG   Urine Drug Screen   Result Value Ref Range    Amphetamine Screen, Ur NEGATIVE NEG    Barbiturate Screen, Ur NEGATIVE NEG    Benzodiazepine Screen, Urine NEGATIVE NEG    Cocaine Metabolite, Urine NEGATIVE NEG    Methadone Screen, Urine NEGATIVE NEG    Opiates, Urine NEGATIVE NEG    Phencyclidine, Urine NEGATIVE NEG    Propoxyphene, Urine NOT REPORTED NEG    Cannabinoid Scrn, Ur NEGATIVE NEG    Oxycodone Screen, Ur NEGATIVE NEG    Methamphetamine, Urine NOT REPORTED NEG    Tricyclic Antidepressants, Urine NOT REPORTED NEG    MDMA, Urine NOT REPORTED NEG    Buprenorphine Urine NOT REPORTED NEG    Test Information       Assay provides medical screening only. The absence of expected drug(s) and/or       Not indicated unless otherwise documented above    RADIOLOGY:   I reviewed the radiologist interpretations:    XR CHEST STANDARD (2 VW)   Preliminary Result   Unchanged enlarged cardiomediastinal silhouette. No focal consolidation, pneumothorax, or significant pleural effusion. Not indicated unless otherwise documented above    EMERGENCY DEPARTMENT COURSE:     The patient was given the following medications:  No orders of the defined types were placed in this encounter.        Vitals:    Vitals:    08/19/18 1637   BP: 126/63   Pulse: 63   Resp: 17   Temp: 97.9 °F (36.6 °C)   SpO2: 100%   Weight: 54.4 kg (120 lb)   Height: 5' 4\" (1.626 m)     -------------------------  /63   Pulse 63   Temp 97.9 °F (36.6 °C)   Resp 17   Ht 5' 4\" (1.626 m)   Wt 54.4 kg (120 lb)   SpO2 100%   BMI 20.60 kg/m²     6:15 PM chest x-ray no infiltrate. Labs unremarkable. Unclear etiology for fatigue. Symptoms for the past several weeks. Thyroid pending. Follow-up with family physician for further testing return if worsening symptoms or any other concerns. I have reviewed the disposition diagnosis with the patient and or their family/guardian. I have answered their questions and given discharge instructions. They voiced understanding of these instructions and did not have any further questions or complaints. CRITICAL CARE:    None    CONSULTS:    None    PROCEDURES:    None      OARRS Report if indicated             FINAL IMPRESSION      1.  Fatigue, unspecified type          DISPOSITION/PLAN   DISPOSITION          PATIENT REFERRED TO:  Geraldine Almodovar MD  [facility] [facility]  [phone]    Schedule an appointment as soon as possible for a visit         DISCHARGE MEDICATIONS:  New Prescriptions    No medications on file       (Please note that portions of this note were completed with a voice recognition program.  Efforts were made to edit the dictations but occasionally words are mis-transcribed.)    Castro DO  Attending Emergency Physician        Don Lowery DO  08/19/18 [id number]

## 2018-08-19 NOTE — ED TRIAGE NOTES
Pt c/o fatigue and intermittent headache for 3 weeks. Pt states she visited her PCP 2 weeks ago for endocarditis.

## 2018-08-21 LAB — TSH SERPL DL<=0.05 MIU/L-ACNC: 1.05 MIU/L (ref 0.3–5)

## 2019-01-13 ENCOUNTER — HOSPITAL ENCOUNTER (EMERGENCY)
Facility: CLINIC | Age: 29
Discharge: LEFT BEFORE DISCHARGE | End: 2019-01-14
Attending: EMERGENCY MEDICINE
Payer: COMMERCIAL

## 2019-01-13 DIAGNOSIS — R07.9 CHEST PAIN, UNSPECIFIED TYPE: Primary | ICD-10-CM

## 2019-01-13 PROCEDURE — 84484 ASSAY OF TROPONIN QUANT: CPT

## 2019-01-13 PROCEDURE — 93005 ELECTROCARDIOGRAM TRACING: CPT

## 2019-01-13 PROCEDURE — 83605 ASSAY OF LACTIC ACID: CPT

## 2019-01-13 PROCEDURE — 83874 ASSAY OF MYOGLOBIN: CPT

## 2019-01-13 PROCEDURE — 99285 EMERGENCY DEPT VISIT HI MDM: CPT

## 2019-01-13 PROCEDURE — 85025 COMPLETE CBC W/AUTO DIFF WBC: CPT

## 2019-01-13 PROCEDURE — 87040 BLOOD CULTURE FOR BACTERIA: CPT

## 2019-01-13 PROCEDURE — 80048 BASIC METABOLIC PNL TOTAL CA: CPT

## 2019-01-13 RX ORDER — ASPIRIN 81 MG/1
324 TABLET, CHEWABLE ORAL ONCE
Status: COMPLETED | OUTPATIENT
Start: 2019-01-14 | End: 2019-01-14

## 2019-01-13 ASSESSMENT — ENCOUNTER SYMPTOMS
WHEEZING: 0
DIARRHEA: 0
VOMITING: 0
ABDOMINAL PAIN: 0
EYE PAIN: 0
SORE THROAT: 0
EYE DISCHARGE: 0
COLOR CHANGE: 0
CONSTIPATION: 0
COUGH: 0
EYE REDNESS: 0
NAUSEA: 0
STRIDOR: 0
SHORTNESS OF BREATH: 1

## 2019-01-14 ENCOUNTER — APPOINTMENT (OUTPATIENT)
Dept: GENERAL RADIOLOGY | Facility: CLINIC | Age: 29
End: 2019-01-14
Payer: COMMERCIAL

## 2019-01-14 ENCOUNTER — APPOINTMENT (OUTPATIENT)
Dept: CT IMAGING | Facility: CLINIC | Age: 29
End: 2019-01-14
Payer: COMMERCIAL

## 2019-01-14 VITALS
RESPIRATION RATE: 11 BRPM | SYSTOLIC BLOOD PRESSURE: 115 MMHG | DIASTOLIC BLOOD PRESSURE: 58 MMHG | BODY MASS INDEX: 21.46 KG/M2 | HEART RATE: 66 BPM | WEIGHT: 125 LBS | OXYGEN SATURATION: 98 % | TEMPERATURE: 97.9 F

## 2019-01-14 LAB
ABSOLUTE EOS #: 0.4 K/UL (ref 0–0.4)
ABSOLUTE IMMATURE GRANULOCYTE: NORMAL K/UL (ref 0–0.3)
ABSOLUTE LYMPH #: 3.2 K/UL (ref 1–4.8)
ABSOLUTE MONO #: 0.9 K/UL (ref 0.1–1.2)
ANION GAP SERPL CALCULATED.3IONS-SCNC: 15 MMOL/L (ref 9–17)
BASOPHILS # BLD: 1 % (ref 0–2)
BASOPHILS ABSOLUTE: 0.1 K/UL (ref 0–0.2)
BUN BLDV-MCNC: 9 MG/DL (ref 6–20)
BUN/CREAT BLD: ABNORMAL (ref 9–20)
CALCIUM SERPL-MCNC: 9.8 MG/DL (ref 8.6–10.4)
CHLORIDE BLD-SCNC: 96 MMOL/L (ref 98–107)
CO2: 25 MMOL/L (ref 20–31)
CREAT SERPL-MCNC: 0.6 MG/DL (ref 0.5–0.9)
DIFFERENTIAL TYPE: NORMAL
EKG ATRIAL RATE: 72 BPM
EKG P AXIS: 52 DEGREES
EKG P-R INTERVAL: 140 MS
EKG Q-T INTERVAL: 402 MS
EKG QRS DURATION: 80 MS
EKG QTC CALCULATION (BAZETT): 440 MS
EKG R AXIS: 67 DEGREES
EKG T AXIS: 38 DEGREES
EKG VENTRICULAR RATE: 72 BPM
EOSINOPHILS RELATIVE PERCENT: 4 % (ref 1–4)
GFR AFRICAN AMERICAN: >60 ML/MIN
GFR NON-AFRICAN AMERICAN: >60 ML/MIN
GFR SERPL CREATININE-BSD FRML MDRD: ABNORMAL ML/MIN/{1.73_M2}
GFR SERPL CREATININE-BSD FRML MDRD: ABNORMAL ML/MIN/{1.73_M2}
GLUCOSE BLD-MCNC: 95 MG/DL (ref 70–99)
HCT VFR BLD CALC: 42.3 % (ref 36–46)
HEMOGLOBIN: 14.8 G/DL (ref 12–16)
IMMATURE GRANULOCYTES: NORMAL %
LACTIC ACID, SEPSIS WHOLE BLOOD: NORMAL MMOL/L (ref 0.5–1.9)
LACTIC ACID, SEPSIS: 1 MMOL/L (ref 0.5–1.9)
LYMPHOCYTES # BLD: 37 % (ref 24–44)
MCH RBC QN AUTO: 33.5 PG (ref 26–34)
MCHC RBC AUTO-ENTMCNC: 35 G/DL (ref 31–37)
MCV RBC AUTO: 95.8 FL (ref 80–100)
MONOCYTES # BLD: 10 % (ref 2–11)
MYOGLOBIN: <21 NG/ML (ref 25–58)
NRBC AUTOMATED: NORMAL PER 100 WBC
PDW BLD-RTO: 13.1 % (ref 12.5–15.4)
PLATELET # BLD: 299 K/UL (ref 140–450)
PLATELET ESTIMATE: NORMAL
PMV BLD AUTO: 7.3 FL (ref 6–12)
POTASSIUM SERPL-SCNC: 4.3 MMOL/L (ref 3.7–5.3)
RBC # BLD: 4.41 M/UL (ref 4–5.2)
RBC # BLD: NORMAL 10*6/UL
SEG NEUTROPHILS: 48 % (ref 36–66)
SEGMENTED NEUTROPHILS ABSOLUTE COUNT: 4.2 K/UL (ref 1.8–7.7)
SODIUM BLD-SCNC: 136 MMOL/L (ref 135–144)
TROPONIN INTERP: ABNORMAL
TROPONIN T: ABNORMAL NG/ML
TROPONIN, HIGH SENSITIVITY: <6 NG/L (ref 0–14)
WBC # BLD: 8.6 K/UL (ref 3.5–11)
WBC # BLD: NORMAL 10*3/UL

## 2019-01-14 PROCEDURE — 6370000000 HC RX 637 (ALT 250 FOR IP): Performed by: EMERGENCY MEDICINE

## 2019-01-14 PROCEDURE — 87186 SC STD MICRODIL/AGAR DIL: CPT

## 2019-01-14 PROCEDURE — 87205 SMEAR GRAM STAIN: CPT

## 2019-01-14 PROCEDURE — 71046 X-RAY EXAM CHEST 2 VIEWS: CPT

## 2019-01-14 PROCEDURE — 87077 CULTURE AEROBIC IDENTIFY: CPT

## 2019-01-14 RX ADMIN — ASPIRIN 81 MG 324 MG: 81 TABLET ORAL at 00:17

## 2019-01-18 LAB
CULTURE: ABNORMAL
CULTURE: ABNORMAL
Lab: ABNORMAL
ORGANISM: ABNORMAL
SPECIMEN DESCRIPTION: ABNORMAL
STATUS: ABNORMAL